# Patient Record
Sex: MALE | Race: WHITE | NOT HISPANIC OR LATINO | Employment: FULL TIME | URBAN - METROPOLITAN AREA
[De-identification: names, ages, dates, MRNs, and addresses within clinical notes are randomized per-mention and may not be internally consistent; named-entity substitution may affect disease eponyms.]

---

## 2021-05-29 ENCOUNTER — RECORDS - HEALTHEAST (OUTPATIENT)
Dept: ADMINISTRATIVE | Facility: CLINIC | Age: 36
End: 2021-05-29

## 2021-05-30 ENCOUNTER — RECORDS - HEALTHEAST (OUTPATIENT)
Dept: ADMINISTRATIVE | Facility: CLINIC | Age: 36
End: 2021-05-30

## 2021-05-31 ENCOUNTER — RECORDS - HEALTHEAST (OUTPATIENT)
Dept: ADMINISTRATIVE | Facility: CLINIC | Age: 36
End: 2021-05-31

## 2021-06-01 ENCOUNTER — RECORDS - HEALTHEAST (OUTPATIENT)
Dept: ADMINISTRATIVE | Facility: CLINIC | Age: 36
End: 2021-06-01

## 2022-04-20 ENCOUNTER — HOSPITAL ENCOUNTER (EMERGENCY)
Facility: CLINIC | Age: 37
Discharge: HOME | End: 2022-04-20
Attending: PHYSICIAN ASSISTANT | Admitting: PHYSICIAN ASSISTANT
Payer: OTHER MISCELLANEOUS

## 2022-04-20 ENCOUNTER — APPOINTMENT (OUTPATIENT)
Dept: GENERAL RADIOLOGY | Facility: CLINIC | Age: 37
End: 2022-04-20
Attending: PHYSICIAN ASSISTANT
Payer: OTHER MISCELLANEOUS

## 2022-04-20 VITALS
BODY MASS INDEX: 25.67 KG/M2 | HEART RATE: 94 BPM | OXYGEN SATURATION: 97 % | WEIGHT: 200 LBS | TEMPERATURE: 98 F | DIASTOLIC BLOOD PRESSURE: 99 MMHG | RESPIRATION RATE: 18 BRPM | SYSTOLIC BLOOD PRESSURE: 144 MMHG | HEIGHT: 74 IN

## 2022-04-20 DIAGNOSIS — S61.310A LACERATION OF RIGHT INDEX FINGER WITHOUT FOREIGN BODY WITH DAMAGE TO NAIL, INITIAL ENCOUNTER: ICD-10-CM

## 2022-04-20 PROCEDURE — 99213 OFFICE O/P EST LOW 20 MIN: CPT | Mod: 25 | Performed by: PHYSICIAN ASSISTANT

## 2022-04-20 PROCEDURE — 73140 X-RAY EXAM OF FINGER(S): CPT | Mod: RT

## 2022-04-20 PROCEDURE — 90715 TDAP VACCINE 7 YRS/> IM: CPT | Performed by: PHYSICIAN ASSISTANT

## 2022-04-20 PROCEDURE — 90471 IMMUNIZATION ADMIN: CPT | Performed by: PHYSICIAN ASSISTANT

## 2022-04-20 PROCEDURE — 12002 RPR S/N/AX/GEN/TRNK2.6-7.5CM: CPT | Performed by: PHYSICIAN ASSISTANT

## 2022-04-20 PROCEDURE — 250N000011 HC RX IP 250 OP 636: Performed by: PHYSICIAN ASSISTANT

## 2022-04-20 PROCEDURE — 250N000011 HC RX IP 250 OP 636: Performed by: EMERGENCY MEDICINE

## 2022-04-20 PROCEDURE — G0463 HOSPITAL OUTPT CLINIC VISIT: HCPCS | Mod: 25 | Performed by: PHYSICIAN ASSISTANT

## 2022-04-20 RX ORDER — ONDANSETRON 4 MG/1
4 TABLET, ORALLY DISINTEGRATING ORAL ONCE
Status: COMPLETED | OUTPATIENT
Start: 2022-04-20 | End: 2022-04-20

## 2022-04-20 RX ADMIN — CLOSTRIDIUM TETANI TOXOID ANTIGEN (FORMALDEHYDE INACTIVATED), CORYNEBACTERIUM DIPHTHERIAE TOXOID ANTIGEN (FORMALDEHYDE INACTIVATED), BORDETELLA PERTUSSIS TOXOID ANTIGEN (GLUTARALDEHYDE INACTIVATED), BORDETELLA PERTUSSIS FILAMENTOUS HEMAGGLUTININ ANTIGEN (FORMALDEHYDE INACTIVATED), BORDETELLA PERTUSSIS PERTACTIN ANTIGEN, AND BORDETELLA PERTUSSIS FIMBRIAE 2/3 ANTIGEN 0.5 ML: 5; 2; 2.5; 5; 3; 5 INJECTION, SUSPENSION INTRAMUSCULAR at 17:59

## 2022-04-20 RX ADMIN — ONDANSETRON 4 MG: 4 TABLET, ORALLY DISINTEGRATING ORAL at 16:24

## 2022-04-20 NOTE — ED PROVIDER NOTES
History     Chief Complaint   Patient presents with     Laceration     HPI  Cory Magallanes is a 36 year old male who presents to urgent care with concern of a laceration to his right index finger which occurred approximately 1 hour prior to arrival.  Patient reports that he was cutting vegetables at work when the knife slipped resulting in laceration to the radial aspect of his right index finger.  He had moderate to severe pain initially which he currently rates as 5/10.  He had significant bleeding and coworker wrapped finger in tight tape. He denies any concern for foreign body embedded in the wound.  He is unsure the date of his last tetanus vaccination.      Allergies:  No Known Allergies    Problem List:    There are no problems to display for this patient.     Past Medical History:    Past Medical History:   Diagnosis Date     Alcohol abuse      Past Surgical History:    History reviewed. No pertinent surgical history.    Family History:    Family History   Problem Relation Age of Onset     Substance Abuse Maternal Grandfather         alcoholism      Coronary Artery Disease Paternal Grandfather         MI age? 60     Social History:  Marital Status:  Single [1]  Social History     Tobacco Use     Smoking status: Current Every Day Smoker     Packs/day: 1.00     Years: 15.00     Pack years: 15.00     Types: Cigarettes     Smokeless tobacco: Never Used   Substance Use Topics     Alcohol use: Yes     Comment: once a week      Drug use: No      Medications:    HYDROcodone-acetaminophen (NORCO) 5-325 MG per tablet  ondansetron (ZOFRAN ODT) 4 MG disintegrating tablet  sucralfate (CARAFATE) 1 GM tablet      Review of Systems  INTEGUMENTARY/SKIN: POSITIVE for laceration right index finger  MUSCULOSKELETAL: POSITIVE  for right finger pain, swelling and NEGATIVE for other concerning arthralgias or myalgias  NEURO: NEGATIVE for numbness, paresthesias   Physical Exam   BP: (!) 144/99  Pulse: 94  Temp: 98  F (36.7  " C)  Resp: 18  Height: 188 cm (6' 2\")  Weight: 90.7 kg (200 lb)  SpO2: 97 %  Physical Exam  Constitutional:       General: He is not in acute distress.     Appearance: He is not ill-appearing or toxic-appearing.   HENT:      Head: Normocephalic and atraumatic.   Musculoskeletal:      Right hand: Swelling, laceration and tenderness present. No deformity or bony tenderness. Normal range of motion. Normal strength. Normal sensation. There is no disruption of two-point discrimination. Normal capillary refill.        Hands:       Comments: 3 cm subcutaneous laceration to the volar and dorsal aspect of the  distal right index finger which just crosses the proximal ulnar aspect of the nailbed    Skin:     General: Skin is warm and dry.      Findings: Laceration and wound present.   Neurological:      Mental Status: He is alert.      Comments: Sensation to light touch of right index finger is grossly intact       [facility]    -Laceration Repair  Performed by: Yessenia Segal PA-C  Authorized by: Yessenia Segal PA-C     Risks, benefits and alternatives discussed.      ANESTHESIA (see MAR for exact dosages):     Anesthesia method:  Nerve block    Block needle gauge:  30 G    Block anesthetic:  Lidocaine 1% w/o epi  LACERATION DETAILS     Location: rght index finger.    Length (cm):  3    REPAIR TYPE:     Repair type:  Simple      EXPLORATION:     Hemostasis achieved with:  Direct pressure    Wound exploration: wound explored through full range of motion and entire depth of wound probed and visualized      TREATMENT:     Area cleansed with:  Hibiclens    Amount of cleaning:  Standard    SKIN REPAIR     Repair method:  Sutures    Suture size:  4-0    Suture technique:  Simple interrupted    APPROXIMATION     Approximation:  Close    POST-PROCEDURE DETAILS     Dressing:  Antibiotic ointment and tube gauze        PROCEDURE    Patient Tolerance:  Patient tolerated the procedure " well with no immediate complications         Critical Care time:  none           Results for orders placed or performed during the hospital encounter of 04/20/22 (from the past 24 hour(s))   XR Finger Right G/E 2 Views    Narrative    EXAM: XR FINGER RT G/E 2 VW  LOCATION: [facility]  DATE/TIME: 4/20/2022 5:47 PM    INDICATION: Finger pain, laceration, concern for fracture.  COMPARISON: None.      Impression    IMPRESSION: Soft tissue laceration. No fracture or joint malalignment. On the oblique projection, there is a 1 mm radiodensity, suspected tiny superficial foreign body or focus of debris; I suspect that this is superficial, on the skin surface, as it is   more difficult to visualize on the other projections. No definite radiopaque foreign body located within the posterior soft tissues.       Medications   ondansetron (ZOFRAN-ODT) ODT tab 4 mg (4 mg Oral Given 4/20/22 1624)   Tdap (tetanus-diphtheria-acell pertussis) (ADACEL) injection 0.5 mL (0.5 mLs Intramuscular Given 4/20/22 1759)     Assessments & Plan (with Medical Decision Making)     I have reviewed the nursing notes.    I have reviewed the findings, diagnosis, plan and need for follow up with the patient.       New Prescriptions    No medications on file     Final diagnoses:   Laceration of right index finger without foreign body with damage to nail, initial encounter     36-year-old male presents to the urgent care with concern over laceration to his right index finger which occurred during work-related injury just prior to arrival.  Physical exam findings significant for 3 cm laceration to the distal right index finger which just crosses the ulnar aspect of the nailbed.  X-ray was obtained which did show a soft tissue laceration, no fracture or malalignment on a single view there was a 1 mm radiodensity suspected tiny superficial foreign body on the surface of the skin as it is difficult to visualize any other projections  which does not correspond to patients current site of laceration.   No definitive radiopaque foreign body located within the posterior soft tissue.  After discussing versus benefits he agreed to proceed with primary closure of his wound with sutures.  He tolerated procedure without any immediate complications.  He was discharged home stable with instructions for suture removal in 10 days.  Suture care instructions, signs of infection, worrisome reasons to return to ER/UC sooner discussed.     Disclaimer: This note consists of symbols derived from keyboarding, dictation, and/or voice recognition software. As a result, there may be errors in the script that have gone undetected.  Please consider this when interpreting information found in the chart.      4/20/2022   [facility] EMERGENCY DEPT     Yessenia Segal PA-C  04/24/22 [id number]

## 2023-05-12 ENCOUNTER — OFFICE VISIT (OUTPATIENT)
Dept: URGENT CARE | Facility: URGENT CARE | Age: 38
End: 2023-05-12

## 2023-05-12 VITALS
HEART RATE: 81 BPM | DIASTOLIC BLOOD PRESSURE: 87 MMHG | SYSTOLIC BLOOD PRESSURE: 134 MMHG | RESPIRATION RATE: 18 BRPM | TEMPERATURE: 97.3 F | OXYGEN SATURATION: 98 % | WEIGHT: 200 LBS | BODY MASS INDEX: 25.68 KG/M2

## 2023-05-12 DIAGNOSIS — K04.7 TOOTH INFECTION: Primary | ICD-10-CM

## 2023-05-12 PROCEDURE — 99203 OFFICE O/P NEW LOW 30 MIN: CPT | Performed by: PHYSICIAN ASSISTANT

## 2023-05-12 RX ORDER — KETOROLAC TROMETHAMINE 10 MG/1
10 TABLET, FILM COATED ORAL EVERY 6 HOURS PRN
Qty: 10 TABLET | Refills: 0 | Status: SHIPPED | OUTPATIENT
Start: 2023-05-12

## 2023-05-12 NOTE — PROGRESS NOTES
Assessment & Plan     Tooth infection  Will teat with Augmentin and toradol. Follow up with dentist.     - amoxicillin-clavulanate (AUGMENTIN) 875-125 MG tablet; Take 1 tablet by mouth 2 times daily for 10 days  - ketorolac (TORADOL) 10 MG tablet; Take 1 tablet (10 mg) by mouth every 6 hours as needed for moderate pain               Return in about 2 days (around 5/14/2023), or if symptoms worsen or fail to improve.    [facility] [place]              Subjective   Chief Complaint   Patient presents with     Urgent Care     Swelling     Per patient states he has been having swelling on left side of face and pain not sure if it's related to teeth or URI.          HPI     Facial swelling     Onset of symptoms was 2 day(s) ago.  Course of illness is worsening.    Severity moderate  Current and Associated symptoms: sore throat, tooth pain on the left   Treatment measures tried include None tried.  Predisposing factors include None.                Review of Systems   Constitutional, HEENT, cardiovascular, pulmonary, gi and gu systems are negative, except as otherwise noted.      Objective    /87   Pulse 81   Temp 97.3  F (36.3  C) (Tympanic)   Resp 18   Wt 90.7 kg (200 lb)   SpO2 98%   BMI 25.68 kg/m    Body mass index is 25.68 kg/m .         Physical Exam  Constitutional:       General: He is not in acute distress.  HENT:      Mouth/Throat:        Comments: There is moderate gum swelling and tenderness at the base of left back molar. No drainage/discharge   Neurological:      Mental Status: He is alert.

## 2023-05-12 NOTE — LETTER
May 12, 2023      Cory Magallanes  [address] [place] [address]        To Whom It May Concern:    Cory Magallanes  was seen and treated in clinic and missed work 5/12/23 and 5/13/23.    Sincerely,        Delma Nam PA-C

## 2025-02-04 ENCOUNTER — OFFICE VISIT (OUTPATIENT)
Dept: FAMILY MEDICINE | Facility: CLINIC | Age: 40
End: 2025-02-04
Payer: COMMERCIAL

## 2025-02-04 VITALS
OXYGEN SATURATION: 96 % | WEIGHT: 206 LBS | DIASTOLIC BLOOD PRESSURE: 84 MMHG | SYSTOLIC BLOOD PRESSURE: 118 MMHG | RESPIRATION RATE: 20 BRPM | HEART RATE: 91 BPM | HEIGHT: 73 IN | TEMPERATURE: 97.5 F | BODY MASS INDEX: 27.3 KG/M2

## 2025-02-04 DIAGNOSIS — H10.12 ALLERGIC RHINOCONJUNCTIVITIS OF LEFT EYE: ICD-10-CM

## 2025-02-04 DIAGNOSIS — R51.9 CHRONIC NONINTRACTABLE HEADACHE, UNSPECIFIED HEADACHE TYPE: ICD-10-CM

## 2025-02-04 DIAGNOSIS — J30.9 ALLERGIC RHINOCONJUNCTIVITIS OF LEFT EYE: ICD-10-CM

## 2025-02-04 DIAGNOSIS — J30.9 ALLERGIC RHINOCONJUNCTIVITIS OF RIGHT EYE: ICD-10-CM

## 2025-02-04 DIAGNOSIS — S06.0X0A CONCUSSION WITHOUT LOSS OF CONSCIOUSNESS, INITIAL ENCOUNTER: ICD-10-CM

## 2025-02-04 DIAGNOSIS — G62.9 NEUROPATHY: Primary | ICD-10-CM

## 2025-02-04 DIAGNOSIS — Z13.220 LIPID SCREENING: ICD-10-CM

## 2025-02-04 DIAGNOSIS — R22.9 LOCALIZED SUPERFICIAL SWELLING, MASS, OR LUMP: ICD-10-CM

## 2025-02-04 DIAGNOSIS — Z13.1 SCREENING FOR DIABETES MELLITUS: ICD-10-CM

## 2025-02-04 DIAGNOSIS — H10.11 ALLERGIC RHINOCONJUNCTIVITIS OF RIGHT EYE: ICD-10-CM

## 2025-02-04 DIAGNOSIS — G89.29 CHRONIC NONINTRACTABLE HEADACHE, UNSPECIFIED HEADACHE TYPE: ICD-10-CM

## 2025-02-04 LAB
CHOLEST SERPL-MCNC: 150 MG/DL
FASTING STATUS PATIENT QL REPORTED: YES
FASTING STATUS PATIENT QL REPORTED: YES
GLUCOSE SERPL-MCNC: 101 MG/DL (ref 70–99)
HDLC SERPL-MCNC: 25 MG/DL
LDLC SERPL CALC-MCNC: 100 MG/DL
NONHDLC SERPL-MCNC: 125 MG/DL
TRIGL SERPL-MCNC: 124 MG/DL

## 2025-02-04 PROCEDURE — 82947 ASSAY GLUCOSE BLOOD QUANT: CPT | Performed by: FAMILY MEDICINE

## 2025-02-04 PROCEDURE — 36415 COLL VENOUS BLD VENIPUNCTURE: CPT | Performed by: FAMILY MEDICINE

## 2025-02-04 PROCEDURE — 99214 OFFICE O/P EST MOD 30 MIN: CPT | Performed by: FAMILY MEDICINE

## 2025-02-04 PROCEDURE — 80061 LIPID PANEL: CPT | Performed by: FAMILY MEDICINE

## 2025-02-04 ASSESSMENT — ENCOUNTER SYMPTOMS: EYE PAIN: 1

## 2025-02-04 ASSESSMENT — PAIN SCALES - GENERAL: PAINLEVEL_OUTOF10: MODERATE PAIN (5)

## 2025-02-04 NOTE — PROGRESS NOTES
"  Assessment & Plan   Problem List Items Addressed This Visit    None  Visit Diagnoses       Neuropathy    -  Primary    Relevant Medications    amitriptyline (ELAVIL) 25 MG tablet    Chronic nonintractable headache, unspecified headache type        Relevant Medications    amitriptyline (ELAVIL) 25 MG tablet    Lipid screening        Relevant Orders    Lipid panel reflex to direct LDL Non-fasting    Screening for diabetes mellitus        Relevant Orders    Glucose    Allergic rhinoconjunctivitis of left eye        Allergic rhinoconjunctivitis of right eye        Concussion without loss of consciousness, initial encounter                 Patient is a 39 yr old male here for multiple issues.     Allergy eye- recommend seeing an eye specialist- suspect he may need steroid eye drops    Headaches- likely due to concussion. Recommend amitriptyline    Neuropathy- etiology unclear. Could be chronic back pain.     Lump- appears like a cyst. Recommend watch and wait.          Nicotine/Tobacco Cessation  He reports that he has been smoking cigarettes. He has a 15 pack-year smoking history. He has never used smokeless tobacco.  Nicotine/Tobacco Cessation Plan  Information offered: Patient not interested at this time      BMI  Estimated body mass index is 26.99 kg/m  as calculated from the following:    Height as of this encounter: 1.861 m (6' 1.25\").    Weight as of this encounter: 93.4 kg (206 lb).       FUTURE APPOINTMENTS:       - Follow-up visit as needed.      Yudi Ray is a 39 year old, presenting for the following health issues:    Patient is a 39 yr old male here for several issues.     Allergy eye symptoms  He reports that he has had watery dry eye for a couple of months. He has red eyes but no purulent discharge from his eyes. His conjunctivae is red.   Patient reports that he has used over the counter medication with no relief. He gets no relief even from allergy medication.    Concussion/Chronic " "Headaches  Patient also reports that he fell a few months ago and fell hard on his posterior part of his head. He had some dizziness and lightheaded feeling after that.  Patient reports that since then he has been experiencing daily severe headaches. Headaches have not inhibited him from work or doing things. He reports no vision changes with the headaches. He was not seen or evaluated following that fall. This will be his second concussion as he had one when he was in      Numbness and tingling in both lower extremities  Lastly he has been experiencing numbness and tingling in both feet. He says he stands on his feet all day. Patient reports that he has tenderness as well in both feet. He says they are quite \"sensitive\" he did mention chronic back issues for years. He has never been screened for diabetes that he is aware of.      Lump on ear lobe  The lump has been there for years. It is non painful. No drainage or bleeding from the lump. It is the same size as it as been.    Eye Problem (Dry and watery eyes.  Started this winter.  He is not sure if this is allergy related. Has tried multiple OTC allergy medications.  All the treatments seem to make his symptoms worse.), Concussion (Concussion about 2-3 months ago.  Has been having a steady headache since.  There is a pounding sensation for the back of the head.  States that is a 5/10 today.), Foot Problems (Both feet have a tingling feeling x 8 months.  Both legs can feel numb off and on.  He is not sure why. ), Ear Problem (Has a lump right face near the upper part of the ear.  There is no pain.  It just keeps getting larger.), and Imm/Inj (Declined Covid and Flu injections today.  Discuss if he is due for a Prevnar 20.)        2/4/2025     8:21 AM   Additional Questions   Roomed by Anne Mcneil CMA   Accompanied by Eugenie-girl friend.     Chief Complaint   Patient presents with    Eye Problem     Dry and watery eyes.  Started this winter.  He is not sure if " "this is allergy related. Has tried multiple OTC allergy medications.  All the treatments seem to make his symptoms worse.    Concussion     Concussion about 2-3 months ago.  Has been having a steady headache since.  There is a pounding sensation for the back of the head.  States that is a 5/10 today.    Foot Problems     Both feet have a tingling feeling x 8 months.  Both legs can feel numb off and on.  He is not sure why.     Ear Problem     Has a lump right face near the upper part of the ear.  There is no pain.  It just keeps getting larger.    Imm/Inj     Declined Covid and Flu injections today.  Discuss if he is due for a Prevnar 20.       Eye Problem     Ear Problem    History of Present Illness       Headaches:   Since the patient's last clinic visit, headaches are: no change  The patient is getting headaches:  Constant  He is able to do normal daily activities when he has a migraine.  The patient is taking the following rescue/relief medications:  No rescue/relief medications   Patient states \"The relief is inconsistent\" from the rescue/relief medications.   The patient is taking the following medications to prevent migraines:  No medications to prevent migraines  In the past 4 weeks, the patient has gone to an Urgent Care or Emergency Room 0 times times due to headaches.    Reason for visit:  Red watery eyes  Symptom onset:  More than a month  Symptoms include:  Watery eyes  Symptom intensity:  Severe  Symptom progression:  Worsening  Had these symptoms before:  Yes  Has tried/received treatment for these symptoms:  No  What makes it worse:  Breanna outside  What makes it better:  No   He is taking medications regularly.             Review of Systems  CONSTITUTIONAL: NEGATIVE for fever, chills, change in weight  INTEGUMENTARY/SKIN: NEGATIVE for worrisome rashes, moles or lesions  EYES: POSITIVE for itching both, redness both, and tearing both  ENT/MOUTH: NEGATIVE for ear, mouth and throat problems  RESP: " "NEGATIVE for significant cough or SOB  CV: NEGATIVE for chest pain, palpitations or peripheral edema  MUSCULOSKELETAL: POSITIVE  for back pain , paresthesias, and radicular pain   NEURO: POSITIVE for paresthesias  and numbness or tingling   ENDOCRINE: NEGATIVE for temperature intolerance, skin/hair changes  HEME/ALLERGY/IMMUNE: NEGATIVE for bleeding problems  PSYCHIATRIC: NEGATIVE for changes in mood or affect      Objective    /84 (BP Location: Left arm, Patient Position: Chair, Cuff Size: Adult Large)   Pulse 91   Temp 97.5  F (36.4  C) (Tympanic)   Resp 20   Ht 1.861 m (6' 1.25\")   Wt 93.4 kg (206 lb)   SpO2 96%   BMI 26.99 kg/m    Body mass index is 26.99 kg/m .  Physical Exam   GENERAL: alert and no distress  EYES: Eyes grossly normal to inspection, PERRL and conjunctivae and sclerae normal  HENT: ear canals and TM's normal, nose and mouth without ulcers or lesions  NECK: no adenopathy, no asymmetry, masses, or scars  RESP: lungs clear to auscultation - no rales, rhonchi or wheezes  CV: regular rate and rhythm, normal S1 S2, no S3 or S4, no murmur, click or rub, no peripheral edema  ABDOMEN: soft, nontender, no hepatosplenomegaly, no masses and bowel sounds normal  MS: no gross musculoskeletal defects noted, no edema  SKIN: no suspicious lesions or rashes, lump on right ear   NEURO: Normal strength and tone, mentation intact and speech normal  Diabetic foot exam: no trophic changes or ulcerative lesions and abnormal sensory exam    Labs pending        Signed Electronically by: Juliane Talamantes MD    "

## 2025-02-04 NOTE — RESULT ENCOUNTER NOTE
Please inform patient that test result showed elevated cholesterol. Recommend lifestyle changes. Glucose was slightly high. Recommend diet and exercise.   Thank you.     Juliane Talamantes M.D.

## 2025-02-04 NOTE — LETTER
February 4, 2025      Cory Hernándezalisons  [address] [place] [address]        Dear ,    We are writing to inform you of your test results.    Please inform patient that test result showed elevated cholesterol. Recommend lifestyle changes. Glucose was slightly high. Recommend diet and exercise.  Thank you.     Juliane Talamantes M.D.    Resulted Orders   Glucose   Result Value Ref Range    Glucose 101 (H) 70 - 99 mg/dL    Patient Fasting > 8hrs? Yes    Lipid panel reflex to direct LDL Non-fasting   Result Value Ref Range    Cholesterol 150 <200 mg/dL    Triglycerides 124 <150 mg/dL    Direct Measure HDL 25 (L) >=40 mg/dL    LDL Cholesterol Calculated 100 (H) <100 mg/dL    Non HDL Cholesterol 125 <130 mg/dL    Patient Fasting > 8hrs? Yes     Narrative    Cholesterol  Desirable: < 200 mg/dL  Borderline High: 200 - 239 mg/dL  High: >= 240 mg/dL    Triglycerides  Normal: < 150 mg/dL  Borderline High: 150 - 199 mg/dL  High: 200-499 mg/dL  Very High: >= 500 mg/dL    Direct Measure HDL  Female: >= 50 mg/dL   Male: >= 40 mg/dL    LDL Cholesterol  Desirable: < 100 mg/dL  Above Desirable: 100 - 129 mg/dL   Borderline High: 130 - 159 mg/dL   High:  160 - 189 mg/dL   Very High: >= 190 mg/dL    Non HDL Cholesterol  Desirable: < 130 mg/dL  Above Desirable: 130 - 159 mg/dL  Borderline High: 160 - 189 mg/dL  High: 190 - 219 mg/dL  Very High: >= 220 mg/dL       If you have any questions or concerns, please call the clinic at the number listed above.       Sincerely,      Juliane Talmaantes MD    Electronically signed

## 2025-03-10 ENCOUNTER — NURSE TRIAGE (OUTPATIENT)
Dept: FAMILY MEDICINE | Facility: CLINIC | Age: 40
End: 2025-03-10

## 2025-03-10 ENCOUNTER — OFFICE VISIT (OUTPATIENT)
Dept: FAMILY MEDICINE | Facility: CLINIC | Age: 40
End: 2025-03-10
Payer: COMMERCIAL

## 2025-03-10 ENCOUNTER — HOSPITAL ENCOUNTER (OUTPATIENT)
Dept: ULTRASOUND IMAGING | Facility: CLINIC | Age: 40
Discharge: HOME | End: 2025-03-10
Attending: NURSE PRACTITIONER | Admitting: NURSE PRACTITIONER
Payer: COMMERCIAL

## 2025-03-10 VITALS
OXYGEN SATURATION: 93 % | HEIGHT: 73 IN | DIASTOLIC BLOOD PRESSURE: 74 MMHG | SYSTOLIC BLOOD PRESSURE: 108 MMHG | BODY MASS INDEX: 28.1 KG/M2 | RESPIRATION RATE: 16 BRPM | HEART RATE: 94 BPM | WEIGHT: 212 LBS | TEMPERATURE: 97.4 F

## 2025-03-10 DIAGNOSIS — M54.41 CHRONIC BILATERAL LOW BACK PAIN WITH BILATERAL SCIATICA: ICD-10-CM

## 2025-03-10 DIAGNOSIS — G62.9 NEUROPATHY: ICD-10-CM

## 2025-03-10 DIAGNOSIS — R73.09 ELEVATED GLUCOSE: ICD-10-CM

## 2025-03-10 DIAGNOSIS — F10.10 ALCOHOL ABUSE: ICD-10-CM

## 2025-03-10 DIAGNOSIS — R60.0 LEG EDEMA, RIGHT: Primary | ICD-10-CM

## 2025-03-10 DIAGNOSIS — D69.6 THROMBOCYTOPENIA: ICD-10-CM

## 2025-03-10 DIAGNOSIS — G89.29 CHRONIC BILATERAL LOW BACK PAIN WITH BILATERAL SCIATICA: ICD-10-CM

## 2025-03-10 DIAGNOSIS — R60.0 LEG EDEMA, RIGHT: ICD-10-CM

## 2025-03-10 DIAGNOSIS — M54.42 CHRONIC BILATERAL LOW BACK PAIN WITH BILATERAL SCIATICA: ICD-10-CM

## 2025-03-10 DIAGNOSIS — R74.8 ELEVATED LIVER ENZYMES: ICD-10-CM

## 2025-03-10 LAB
ALBUMIN SERPL BCG-MCNC: 3.4 G/DL (ref 3.5–5.2)
ALP SERPL-CCNC: 277 U/L (ref 40–150)
ALT SERPL W P-5'-P-CCNC: 60 U/L (ref 0–70)
ANION GAP SERPL CALCULATED.3IONS-SCNC: 13 MMOL/L (ref 7–15)
AST SERPL W P-5'-P-CCNC: 243 U/L (ref 0–45)
BILIRUB SERPL-MCNC: 3.4 MG/DL
BUN SERPL-MCNC: 8.1 MG/DL (ref 6–20)
CALCIUM SERPL-MCNC: 8.7 MG/DL (ref 8.8–10.4)
CHLORIDE SERPL-SCNC: 105 MMOL/L (ref 98–107)
CREAT SERPL-MCNC: 0.76 MG/DL (ref 0.67–1.17)
CRP SERPL-MCNC: 5.97 MG/L
EGFRCR SERPLBLD CKD-EPI 2021: >90 ML/MIN/1.73M2
ERYTHROCYTE [DISTWIDTH] IN BLOOD BY AUTOMATED COUNT: 15.3 % (ref 10–15)
ERYTHROCYTE [SEDIMENTATION RATE] IN BLOOD BY WESTERGREN METHOD: 14 MM/HR (ref 0–15)
EST. AVERAGE GLUCOSE BLD GHB EST-MCNC: 100 MG/DL
FERRITIN SERPL-MCNC: 509 NG/ML (ref 31–409)
GLUCOSE SERPL-MCNC: 107 MG/DL (ref 70–99)
HBA1C MFR BLD: 5.1 % (ref 0–5.6)
HCO3 SERPL-SCNC: 25 MMOL/L (ref 22–29)
HCT VFR BLD AUTO: 44.1 % (ref 40–53)
HGB BLD-MCNC: 15.5 G/DL (ref 13.3–17.7)
IRON BINDING CAPACITY (ROCHE): 196 UG/DL (ref 240–430)
IRON SATN MFR SERPL: 39 % (ref 15–46)
IRON SERPL-MCNC: 77 UG/DL (ref 61–157)
MCH RBC QN AUTO: 35.6 PG (ref 26.5–33)
MCHC RBC AUTO-ENTMCNC: 35.1 G/DL (ref 31.5–36.5)
MCV RBC AUTO: 101 FL (ref 78–100)
PLAT MORPH BLD: NORMAL
PLATELET # BLD AUTO: 36 10E3/UL (ref 150–450)
POTASSIUM SERPL-SCNC: 3.7 MMOL/L (ref 3.4–5.3)
PROT SERPL-MCNC: 7.2 G/DL (ref 6.4–8.3)
RBC # BLD AUTO: 4.35 10E6/UL (ref 4.4–5.9)
RBC MORPH BLD: NORMAL
SODIUM SERPL-SCNC: 143 MMOL/L (ref 135–145)
WBC # BLD AUTO: 6.6 10E3/UL (ref 4–11)

## 2025-03-10 PROCEDURE — 3074F SYST BP LT 130 MM HG: CPT | Performed by: NURSE PRACTITIONER

## 2025-03-10 PROCEDURE — 82728 ASSAY OF FERRITIN: CPT | Performed by: NURSE PRACTITIONER

## 2025-03-10 PROCEDURE — 80053 COMPREHEN METABOLIC PANEL: CPT | Performed by: NURSE PRACTITIONER

## 2025-03-10 PROCEDURE — 83036 HEMOGLOBIN GLYCOSYLATED A1C: CPT | Performed by: NURSE PRACTITIONER

## 2025-03-10 PROCEDURE — 99214 OFFICE O/P EST MOD 30 MIN: CPT | Performed by: NURSE PRACTITIONER

## 2025-03-10 PROCEDURE — 87340 HEPATITIS B SURFACE AG IA: CPT | Performed by: NURSE PRACTITIONER

## 2025-03-10 PROCEDURE — 85027 COMPLETE CBC AUTOMATED: CPT | Performed by: NURSE PRACTITIONER

## 2025-03-10 PROCEDURE — 93970 EXTREMITY STUDY: CPT

## 2025-03-10 PROCEDURE — 83540 ASSAY OF IRON: CPT | Performed by: NURSE PRACTITIONER

## 2025-03-10 PROCEDURE — 3078F DIAST BP <80 MM HG: CPT | Performed by: NURSE PRACTITIONER

## 2025-03-10 PROCEDURE — G2211 COMPLEX E/M VISIT ADD ON: HCPCS | Performed by: NURSE PRACTITIONER

## 2025-03-10 PROCEDURE — 82248 BILIRUBIN DIRECT: CPT | Performed by: NURSE PRACTITIONER

## 2025-03-10 PROCEDURE — 83550 IRON BINDING TEST: CPT | Performed by: NURSE PRACTITIONER

## 2025-03-10 PROCEDURE — 85652 RBC SED RATE AUTOMATED: CPT | Performed by: NURSE PRACTITIONER

## 2025-03-10 PROCEDURE — 36415 COLL VENOUS BLD VENIPUNCTURE: CPT | Performed by: NURSE PRACTITIONER

## 2025-03-10 PROCEDURE — 86140 C-REACTIVE PROTEIN: CPT | Performed by: NURSE PRACTITIONER

## 2025-03-10 PROCEDURE — 1125F AMNT PAIN NOTED PAIN PRSNT: CPT | Performed by: NURSE PRACTITIONER

## 2025-03-10 RX ORDER — GABAPENTIN 100 MG/1
100 CAPSULE ORAL 3 TIMES DAILY
Qty: 90 CAPSULE | Refills: 0 | Status: SHIPPED | OUTPATIENT
Start: 2025-03-10

## 2025-03-10 ASSESSMENT — PAIN SCALES - GENERAL: PAINLEVEL_OUTOF10: SEVERE PAIN (8)

## 2025-03-10 ASSESSMENT — ENCOUNTER SYMPTOMS: NUMBNESS: 1

## 2025-03-10 NOTE — ASSESSMENT & PLAN NOTE
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  Chronic back pain and lumbar back that radiates into his thorax.  Intermittent radiculopathy and paresthesias.  Currently has neuropathy in bilateral feet all the way up to the posterior knees.  Referral to spine care for further evaluation.

## 2025-03-10 NOTE — ASSESSMENT & PLAN NOTE
Unclear cause, glucose is elevated but not at diabetic levels  He does have chronic back pain with some radiculopathy but it seems like he has more neuropathy in his feet and that radiates up.    EMG ordered  I will very strongly consider neurology consult for further evaluation   start with gabapentin 100 mg 3 times a day can go up from there if this is no improvement.

## 2025-03-10 NOTE — ASSESSMENT & PLAN NOTE
Drinks more than 6 beers per day  Check liver function testing today.  Triglycerides were under 150  Initial recommendation will be to have him cut down however I suspect that this is going to be difficult and would consider a referral to chemical dependency in the future.

## 2025-03-10 NOTE — TELEPHONE ENCOUNTER
"See triage below. Patient denies fever. Patient was scheduled at the [facility] today with  [facility].    Aniya Skinner RN          Reason for Disposition   SEVERE pain (e.g., excruciating, unable to do any normal activities)    Additional Information   Negative: Followed a foot injury   Negative: Toe pain is main symptom   Negative: Ankle pain is main symptom   Negative: Entire foot is cool or blue in comparison to other foot   Negative: Purple or black skin on foot or toe   Negative: Red area or streak and fever     Denies fever   Negative: Swollen foot and fever   Negative: Patient sounds very sick or weak to the triager     Patient is able to work and walk    Answer Assessment - Initial Assessment Questions  1. ONSET: \"When did the pain start?\"       1 week age but getting worse.  2. LOCATION: \"Where is the pain located?\"       Right foot, ankle and calf.  3. PAIN: \"How bad is the pain?\"    (Scale 1-10; or mild, moderate, severe)      8-9/10  4. WORK OR EXERCISE: \"Has there been any recent work or exercise that involved this part of the body?\"       No known injury. He has been able to work  5. CAUSE: \"What do you think is causing the foot pain?\"      Swollen and red  6. OTHER SYMPTOMS: \"Do you have any other symptoms?\" (e.g., leg pain, rash, fever, numbness)      Pain is in foot, ankle and calf, no fever. Both feet has numbness and tingling.  7. PREGNANCY: \"Is there any chance you are pregnant?\" \"When was your last menstrual period?\"      na    Protocols used: Foot Pain-A-[place]    "

## 2025-03-10 NOTE — PROGRESS NOTES
Assessment & Plan   Problem List Items Addressed This Visit       Alcohol abuse     Drinks more than 6 beers per day  Check liver function testing today.  Triglycerides were under 150  Initial recommendation will be to have him cut down however I suspect that this is going to be difficult and would consider a referral to chemical dependency in the future.         Neuropathy     Unclear cause, glucose is elevated but not at diabetic levels  He does have chronic back pain with some radiculopathy but it seems like he has more neuropathy in his feet and that radiates up.    EMG ordered  I will very strongly consider neurology consult for further evaluation   start with gabapentin 100 mg 3 times a day can go up from there if this is no improvement.           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  Chronic back pain and lumbar back that radiates into his thorax.  Intermittent radiculopathy and paresthesias.  Currently has neuropathy in bilateral feet all the way up to the posterior knees.  Referral to spine care for further evaluation.         Relevant Medications    gabapentin (NEURONTIN) 100 MG capsule    Other Relevant Orders    Spine  Referral     Other Visit Diagnoses       Leg edema, right    -  Primary    Relevant Orders    CBC with platelets    Comprehensive metabolic panel (BMP + Alb, Alk Phos, ALT, AST, Total. Bili, TP)    ESR: Erythrocyte sedimentation rate (Completed)    CRP, inflammation    US Lower Extremity Venous Duplex Bilateral    Elevated glucose        Relevant Orders    Hemoglobin A1c (Completed)          Right leg edema: DD includes DVT, venous stasis, superficial phlebitis.  Will start lab testing, ultrasound to be done today to evaluate for DVT.  He has no family history or personal history of DVTs.  But he does have a  "significant increase in the size of his right calf over the left calf.  From there we can decide what the next steps are.  Referral placed for vasculare medicine. U/S bilateral lower extremities for negative for DVT.  It is possible that his swelling in his lower extremities is due to liver failure secondary to alcohol.      Addendum 5 pm 3/10/2025  Thrombocytopenia  Platelets are 36 unclear etiology but further testing is placed; HIV, Hep C, PTT  Referral to Hematology and an E-consult is placed  Recommend they come back for the lab testing tomorrow    Elevated liver enzymes  Suspect Alcohol hepatic steatosis/fibrosis  Fib 4 score 35.73 (Advanced liver fibrosis)  Discussed with patient and significant other to discontinue alcohol immediately  Labs ordered  Fibrosis scan ordered  Referral to liver speciality and chemical dependency.  For now will hold on naltrexone because his liver enzymes are elevated.     BMI  Estimated body mass index is 27.78 kg/m  as calculated from the following:    Height as of this encounter: 1.861 m (6' 1.25\").    Weight as of this encounter: 96.2 kg (212 lb).   Weight management plan: will discuss in future visits    FUTURE APPOINTMENTS:       - Follow-up visit in 1-2 weeks      Subjective   Cory is a 39 year old, presenting for the following health issues:  Musculoskeletal Problem    History of Present Illness       Reason for visit:  Right foot and leg red and swollen and hurting.no known injury to cause this.elevated foot last night while sleeping and swelling went down slightly.  Symptom onset:  More than a month  Symptom intensity:  Moderate  Symptom progression:  Worsening  Had these symptoms before:  Yes  Has tried/received treatment for these symptoms:  Yes  Previous treatment was successful:  No  What makes it worse:  No  What makes it better:  No   He is taking medications regularly.        Patient is here with several concerns.      Right lower extremity edema: Over the past " "week he has had increasing swelling in his right lower extremity, calf pain, edema in the ankle and into the calf.  Family or personal history of blood clots.  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  The rest of his lifestyle he is sedentary watching TV and drinking beer.  No recent surgeries or trips.    numbness in both feet: Has occurred for the past 1-1/2 to 2 years.  Feels that it has worsened recently.  He is not having a lot of sensation at all in his toes.  He also has significant numbness and tingling and neuropathic pain.  Sensation change all the way up to his posterior knee.  No known history of diabetes.  Glucose was checked he was seen by Dr. Talamantes 2/4/2025, she treated him with amitriptyline which he did not find to be helpful.    Chronic back pain: Ongoing for many years he used to work in construction as a , pain is periodic and intermittent, sometimes he does have symptoms that radiate down his legs.  States it starts in the lumbar back and then radiates up into affect his thoracic back.  He has never had any imaging or care from spine orthopedics.    Alcohol use: States that he drinks 6+ beers per day every day.      Tobacco 12 years 1/ pack per day; the past several months has decreased his cigarette use to 5/day  Nutrition: Tends to eat a [employer] diet, likes protein and vegetables.  Does not tend to eat a lot of processed foods or fried foods.  Exercise: Physically active at his job but otherwise is sedentary  Drugs: No other drugs    Denies chest pain, shortness of breath,   Occasional wheezing, coughing in the morning    Review of Systems  Constitutional, HEENT, cardiovascular, pulmonary, gi and gu systems are negative, except as otherwise noted.      Objective    /74 (BP Location: Right arm, Patient Position: Sitting, Cuff Size: Adult Large)   Pulse 94   Temp 97.4  F (36.3  C) (Tympanic)   Resp 16   Ht 1.861 m (6' 1.25\")   Wt 96.2 kg (212 lb)   SpO2 93%   BMI " 27.78 kg/m    Body mass index is 27.78 kg/m .  Physical Exam  Constitutional:       Appearance: Normal appearance.   HENT:      Head: Normocephalic.      Right Ear: Tympanic membrane, ear canal and external ear normal.      Left Ear: Tympanic membrane, ear canal and external ear normal.      Nose: Nose normal.      Mouth/Throat:      Mouth: Mucous membranes are moist.      Pharynx: Oropharynx is clear.   Eyes:      Extraocular Movements: Extraocular movements intact.      Conjunctiva/sclera: Conjunctivae normal.      Pupils: Pupils are equal, round, and reactive to light.   Neck:      Thyroid: No thyroid mass, thyromegaly or thyroid tenderness.      Trachea: Trachea normal.   Cardiovascular:      Rate and Rhythm: Normal rate and regular rhythm.      Pulses:           Dorsalis pedis pulses are 2+ on the right side and 2+ on the left side.        Posterior tibial pulses are 2+ on the right side and 2+ on the left side.      Heart sounds: Normal heart sounds.   Pulmonary:      Effort: Pulmonary effort is normal.      Breath sounds: Normal breath sounds.   Abdominal:      General: Abdomen is flat. Bowel sounds are normal.   Musculoskeletal:         General: Normal range of motion.      Cervical back: Normal range of motion.      Right lower le+ Edema present.      Left lower leg: Normal.      Comments: Right calf size 41 cm  Left calf size 36.5 cm   Feet:      Right foot:      Skin integrity: Skin integrity normal.      Toenail Condition: Right toenails are normal.      Left foot:      Skin integrity: Skin integrity normal.      Toenail Condition: Left toenails are normal.      Comments: Neuropathy and difficulty sensing the microfilament in his bilateral feet ankles shins.  He could start to feel the microfilament at inferior knee bilaterally.  Lymphadenopathy:      Cervical: No cervical adenopathy.   Skin:     General: Skin is warm and dry.   Neurological:      Mental Status: He is alert and oriented to person,  place, and time.   Psychiatric:         Mood and Affect: Mood normal.         Behavior: Behavior normal.         Thought Content: Thought content normal.         Judgment: Judgment normal.                    Signed Electronically by: RICHARD Amaya CNP

## 2025-03-11 ENCOUNTER — E-CONSULT (OUTPATIENT)
Dept: TRANSPLANT | Facility: CLINIC | Age: 40
End: 2025-03-11
Payer: COMMERCIAL

## 2025-03-11 ENCOUNTER — TELEPHONE (OUTPATIENT)
Dept: VASCULAR SURGERY | Facility: CLINIC | Age: 40
End: 2025-03-11

## 2025-03-11 ENCOUNTER — LAB (OUTPATIENT)
Dept: LAB | Facility: CLINIC | Age: 40
End: 2025-03-11
Payer: COMMERCIAL

## 2025-03-11 DIAGNOSIS — D69.6 THROMBOCYTOPENIA: ICD-10-CM

## 2025-03-11 DIAGNOSIS — R74.8 ELEVATED LIVER ENZYMES: ICD-10-CM

## 2025-03-11 DIAGNOSIS — R60.0 LEG EDEMA, RIGHT: Primary | ICD-10-CM

## 2025-03-11 LAB
APTT PPP: 39 SECONDS (ref 22–38)
BASOPHILS # BLD AUTO: 0.1 10E3/UL (ref 0–0.2)
BASOPHILS NFR BLD AUTO: 2 %
BILIRUB DIRECT SERPL-MCNC: 2.23 MG/DL (ref 0–0.3)
BILIRUB SERPL-MCNC: 3.4 MG/DL
D DIMER PPP FEU-MCNC: 2.7 UG/ML FEU (ref 0–0.5)
EOSINOPHIL # BLD AUTO: 0.2 10E3/UL (ref 0–0.7)
EOSINOPHIL NFR BLD AUTO: 3 %
ERYTHROCYTE [DISTWIDTH] IN BLOOD BY AUTOMATED COUNT: 15.3 % (ref 10–15)
FIBRINOGEN PPP-MCNC: 229 MG/DL (ref 170–510)
HBV SURFACE AG SERPL QL IA: NONREACTIVE
HCT VFR BLD AUTO: 45.1 % (ref 40–53)
HGB BLD-MCNC: 15.7 G/DL (ref 13.3–17.7)
IMM GRANULOCYTES # BLD: 0 10E3/UL
IMM GRANULOCYTES NFR BLD: 0 %
INR PPP: 1.37 (ref 0.85–1.15)
LYMPHOCYTES # BLD AUTO: 2.5 10E3/UL (ref 0.8–5.3)
LYMPHOCYTES NFR BLD AUTO: 38 %
MCH RBC QN AUTO: 35.8 PG (ref 26.5–33)
MCHC RBC AUTO-ENTMCNC: 34.8 G/DL (ref 31.5–36.5)
MCV RBC AUTO: 103 FL (ref 78–100)
MONOCYTES # BLD AUTO: 0.6 10E3/UL (ref 0–1.3)
MONOCYTES NFR BLD AUTO: 10 %
NEUTROPHILS # BLD AUTO: 3.1 10E3/UL (ref 1.6–8.3)
NEUTROPHILS NFR BLD AUTO: 48 %
NRBC # BLD AUTO: 0 10E3/UL
NRBC BLD AUTO-RTO: 0 /100
PLAT MORPH BLD: NORMAL
PLATELET # BLD AUTO: 33 10E3/UL (ref 150–450)
RBC # BLD AUTO: 4.39 10E6/UL (ref 4.4–5.9)
RBC MORPH BLD: NORMAL
RETICS # AUTO: 0.11 10E6/UL (ref 0.03–0.1)
RETICS/RBC NFR AUTO: 2.4 % (ref 0.5–2)
WBC # BLD AUTO: 6.6 10E3/UL (ref 4–11)

## 2025-03-11 PROCEDURE — 85379 FIBRIN DEGRADATION QUANT: CPT

## 2025-03-11 PROCEDURE — 85730 THROMBOPLASTIN TIME PARTIAL: CPT

## 2025-03-11 PROCEDURE — 86803 HEPATITIS C AB TEST: CPT

## 2025-03-11 PROCEDURE — 85610 PROTHROMBIN TIME: CPT

## 2025-03-11 PROCEDURE — 85025 COMPLETE CBC W/AUTO DIFF WBC: CPT

## 2025-03-11 PROCEDURE — 99451 NTRPROF PH1/NTRNET/EHR 5/>: CPT | Performed by: INTERNAL MEDICINE

## 2025-03-11 PROCEDURE — 85384 FIBRINOGEN ACTIVITY: CPT

## 2025-03-11 PROCEDURE — 85045 AUTOMATED RETICULOCYTE COUNT: CPT

## 2025-03-11 PROCEDURE — 99207 BLOOD MORPHOLOGY PATHOLOGIST REVIEW: CPT | Performed by: PATHOLOGY

## 2025-03-11 PROCEDURE — 36415 COLL VENOUS BLD VENIPUNCTURE: CPT

## 2025-03-11 RX ORDER — NALTREXONE HYDROCHLORIDE 50 MG/1
50 TABLET, FILM COATED ORAL DAILY
Qty: 90 TABLET | Refills: 0 | Status: SHIPPED | OUTPATIENT
Start: 2025-03-11 | End: 2025-03-11

## 2025-03-11 NOTE — TELEPHONE ENCOUNTER
Vascular Referral Intake    Appointment note (to be pasted into appt note. Also add where additional info is located ie: outside images pushed to PACS, in Epic, sent to HIM, etc): RLE swelling, DVT study negative, possible swelling d/t liver failure secondary to alcohol    Referred by Chastity Baltazar APRN CNP for Leg edema, right     Specialty: Vascular Medicine    Specific Provider if Necessary:  Dr. Judith Mcarthur    Visit Type: New    Time Frame: Next Available    Testing/Imaging Needed Before Consult: N/A    Iris or bed needed: No    *Schedulers: Please send welcome letter to patient after appointment(s) scheduled*

## 2025-03-11 NOTE — PROGRESS NOTES
3/11/2025     E-Consult has been accepted.    Interprofessional consultation requested by:  Chastity Batlazar APRN CNP      Clinical Question/Purpose: MY CLINICAL QUESTION IS: patient's platelets are 36, has right leg edema, venous ultrasound was negative for DVT. Patient is alcoholic; AST 250s, patient has been undoctored for the past 5-7 years. It is unclear if this is acute or chronic. Hep c, HIV, PTT testing is pending for tomorrow.  Over the past week he has had increasing leg edema in the right leg.What are my next steps for testing? And does he need to be seen sooner by hematology? Could alcohol be playing a role?    Patient assessment and information reviewed:   - EtOH 6 beers per day    Cr 0.76  , ALT 60 (in 12/3/2018 it was 88/158, Tbili 0.8, Dbili 0.3)  Tbili 3.4, Dbili not done  AlkP 277  Alb 3.4  Ferritin 509  WBC 6.6, diff not done.  Hgb 15.5,   Plts 36    Last plts 12/2018 180, 2016 was 180, 2013 plts 243, 2009 was 238    BLE US neg for DVT    Recommendations:   Increasing leg edema - unclear cause. Consider getting CTAP with venogram to look at abdominal/pelvic vasculature and May Thurner syndrome higher up.   Thrombocytoepnia - could be from liver failure (given low albumin and high bili). Three potential causes related to alcohol and liver disease - direct alcohol toxicity on the marrow, poor liver synthetic function causing low thrombopoietin, or portal hypertension causing hypersplenism. The CT AP could be helpful for looking for splenomegaly if he has a difficult exam. Could also be consumption from a clot. CT AP with venogram could also look for portal vein thrombosis.   Check D-Dimer, PTT, INR, fibrinogen, get T bili and D bili.  Encourage EtOH cessation, follow CBC with diff.   Happy to help interpret any follow up labs/imaging.  Consider hepatology referral for guideline based care for patients with cirrhosis?    The recommendations provided in this E-Consult are based on  a review of clinical data pertinent to the clinical question presented, without a review of the patient's complete medical record or, the benefit of a comprehensive in-person or virtual patient evaluation. This consultation should not replace the clinical judgement and evaluation of the provider ordering this E-Consult. Any new clinical issues, or changes in patient status since the filing of this E-Consult will need to be taken into account when assessing these recommendations. Please contact me if you have further questions.    My total time spent reviewing clinical information and formulating assessment was 15 minutes.    Rashad Herman MD

## 2025-03-11 NOTE — PROGRESS NOTES
"Clinic staff: Please call patient to schedule lab only visit today for additional labs that I have ordered.     Nursing staff: please call patient to let him know to schedule the CT scan of abdomen pelvis. Please let him know that I received an answer from the Hematologist doctor who advised a CT/abd pelvis with venogram to check the organs and vasculature of the abdomen/pelvis. I also have ordered additional labs for them to have done today with a lab only appointment.         Received advise from hematology Rashad Herman MD     \"Recommendations:   Increasing leg edema - unclear cause. Consider getting CTAP with venogram to look at abdominal/pelvic vasculature and May Thurner syndrome higher up.   Thrombocytoepnia - could be from liver failure (given low albumin and high bili). Three potential causes related to alcohol and liver disease - direct alcohol toxicity on the marrow, poor liver synthetic function causing low thrombopoietin, or portal hypertension causing hypersplenism. The CT AP could be helpful for looking for splenomegaly if he has a difficult exam. Could also be consumption from a clot. CT AP with venogram could also look for portal vein thrombosis.   Check D-Dimer, PTT, INR, fibrinogen, get T bili and D bili.  Encourage EtOH cessation, follow CBC with diff.   Happy to help interpret any follow up labs/imaging.  Consider hepatology referral for guideline based care for patients with cirrhosis?\"    Referral given for hepatology, chem dep, Hematology.     CTAP with venogram ordered now  Labs ordered D-dimer, PTT, INR, fibrinogen, Total and direct bili, I also ordered labs to check liver cirrohoiss secondary causes and fibrosis scan.     Chastity Cesar, APRN, CNP    "

## 2025-03-12 LAB — HCV AB SERPL QL IA: NONREACTIVE

## 2025-03-13 ENCOUNTER — TELEPHONE (OUTPATIENT)
Dept: FAMILY MEDICINE | Facility: CLINIC | Age: 40
End: 2025-03-13
Payer: COMMERCIAL

## 2025-03-13 DIAGNOSIS — D69.6 THROMBOCYTOPENIA: Primary | ICD-10-CM

## 2025-03-13 DIAGNOSIS — R74.8 ELEVATED LIVER ENZYMES: ICD-10-CM

## 2025-03-13 LAB
PATH REPORT.COMMENTS IMP SPEC: NORMAL
PATH REPORT.FINAL DX SPEC: NORMAL
PATH REPORT.MICROSCOPIC SPEC OTHER STN: NORMAL
PATH REPORT.MICROSCOPIC SPEC OTHER STN: NORMAL
PATH REPORT.RELEVANT HX SPEC: NORMAL

## 2025-03-13 RX ORDER — PHYTONADIONE 5 MG/1
5 TABLET ORAL DAILY
Qty: 3 TABLET | Refills: 0 | Status: SHIPPED | OUTPATIENT
Start: 2025-03-13

## 2025-03-13 NOTE — PROGRESS NOTES
Dr Herman hematology has recommended Vitamin K 5 mg x 3 doses. Then recheck anticoag labs.   CT abd/pelvis venogram is ordered.     Chastity Cesar, APRN, CNP

## 2025-03-14 ENCOUNTER — HOSPITAL ENCOUNTER (OUTPATIENT)
Dept: CT IMAGING | Facility: CLINIC | Age: 40
Discharge: HOME | End: 2025-03-14
Attending: NURSE PRACTITIONER
Payer: COMMERCIAL

## 2025-03-14 DIAGNOSIS — F10.10 ALCOHOL ABUSE: ICD-10-CM

## 2025-03-14 DIAGNOSIS — R74.8 ELEVATED LIVER ENZYMES: ICD-10-CM

## 2025-03-14 DIAGNOSIS — D69.6 THROMBOCYTOPENIA: ICD-10-CM

## 2025-03-14 PROCEDURE — 74174 CTA ABD&PLVS W/CONTRAST: CPT

## 2025-03-14 PROCEDURE — 250N000009 HC RX 250: Performed by: NURSE PRACTITIONER

## 2025-03-14 PROCEDURE — 250N000011 HC RX IP 250 OP 636: Performed by: NURSE PRACTITIONER

## 2025-03-14 RX ORDER — IOPAMIDOL 755 MG/ML
104 INJECTION, SOLUTION INTRAVASCULAR ONCE
Status: COMPLETED | OUTPATIENT
Start: 2025-03-14 | End: 2025-03-14

## 2025-03-14 RX ORDER — FLUOROMETHOLONE 1 MG/ML
SUSPENSION/ DROPS OPHTHALMIC
COMMUNITY
Start: 2025-02-05

## 2025-03-14 RX ADMIN — SODIUM CHLORIDE 70 ML: 9 INJECTION, SOLUTION INTRAVENOUS at 11:23

## 2025-03-14 RX ADMIN — IOPAMIDOL 104 ML: 755 INJECTION, SOLUTION INTRAVENOUS at 11:23

## 2025-03-17 ENCOUNTER — LAB (OUTPATIENT)
Dept: LAB | Facility: CLINIC | Age: 40
End: 2025-03-17
Payer: COMMERCIAL

## 2025-03-17 ENCOUNTER — PATIENT OUTREACH (OUTPATIENT)
Dept: CARE COORDINATION | Facility: CLINIC | Age: 40
End: 2025-03-17

## 2025-03-17 DIAGNOSIS — R74.8 ELEVATED LIVER ENZYMES: ICD-10-CM

## 2025-03-17 DIAGNOSIS — D69.6 THROMBOCYTOPENIA: ICD-10-CM

## 2025-03-17 DIAGNOSIS — F10.10 ALCOHOL ABUSE: ICD-10-CM

## 2025-03-17 LAB
ALBUMIN SERPL BCG-MCNC: 3.3 G/DL (ref 3.5–5.2)
ALP SERPL-CCNC: 176 U/L (ref 40–150)
ALT SERPL W P-5'-P-CCNC: 58 U/L (ref 0–70)
AST SERPL W P-5'-P-CCNC: 178 U/L (ref 0–45)
BASOPHILS # BLD AUTO: 0.1 10E3/UL (ref 0–0.2)
BASOPHILS NFR BLD AUTO: 2 %
BILIRUB DIRECT SERPL-MCNC: 4.56 MG/DL (ref 0–0.3)
BILIRUB SERPL-MCNC: 8.2 MG/DL
EOSINOPHIL # BLD AUTO: 0.2 10E3/UL (ref 0–0.7)
EOSINOPHIL NFR BLD AUTO: 4 %
ERYTHROCYTE [DISTWIDTH] IN BLOOD BY AUTOMATED COUNT: 14.3 % (ref 10–15)
HCT VFR BLD AUTO: 44 % (ref 40–53)
HGB BLD-MCNC: 15.4 G/DL (ref 13.3–17.7)
IMM GRANULOCYTES # BLD: 0 10E3/UL
IMM GRANULOCYTES NFR BLD: 0 %
LYMPHOCYTES # BLD AUTO: 1.4 10E3/UL (ref 0.8–5.3)
LYMPHOCYTES NFR BLD AUTO: 24 %
MCH RBC QN AUTO: 36 PG (ref 26.5–33)
MCHC RBC AUTO-ENTMCNC: 35 G/DL (ref 31.5–36.5)
MCV RBC AUTO: 103 FL (ref 78–100)
MONOCYTES # BLD AUTO: 0.9 10E3/UL (ref 0–1.3)
MONOCYTES NFR BLD AUTO: 15 %
NEUTROPHILS # BLD AUTO: 3.1 10E3/UL (ref 1.6–8.3)
NEUTROPHILS NFR BLD AUTO: 55 %
NRBC # BLD AUTO: 0 10E3/UL
NRBC BLD AUTO-RTO: 0 /100
PLATELET # BLD AUTO: 30 10E3/UL (ref 150–450)
PROT SERPL-MCNC: 7 G/DL (ref 6.4–8.3)
RBC # BLD AUTO: 4.28 10E6/UL (ref 4.4–5.9)
WBC # BLD AUTO: 5.7 10E3/UL (ref 4–11)

## 2025-03-17 PROCEDURE — 82746 ASSAY OF FOLIC ACID SERUM: CPT

## 2025-03-17 PROCEDURE — 82607 VITAMIN B-12: CPT

## 2025-03-17 PROCEDURE — 80076 HEPATIC FUNCTION PANEL: CPT

## 2025-03-17 PROCEDURE — 36415 COLL VENOUS BLD VENIPUNCTURE: CPT

## 2025-03-17 PROCEDURE — 85025 COMPLETE CBC W/AUTO DIFF WBC: CPT

## 2025-03-17 PROCEDURE — 99000 SPECIMEN HANDLING OFFICE-LAB: CPT

## 2025-03-17 PROCEDURE — 84425 ASSAY OF VITAMIN B-1: CPT | Mod: 90

## 2025-03-18 LAB
FOLATE SERPL-MCNC: 4.5 NG/ML (ref 4.6–34.8)
VIT B12 SERPL-MCNC: 1787 PG/ML (ref 232–1245)

## 2025-03-19 LAB — HIV 1+2 AB+HIV1 P24 AG SERPL QL IA: NONREACTIVE

## 2025-03-23 ENCOUNTER — HEALTH MAINTENANCE LETTER (OUTPATIENT)
Age: 40
End: 2025-03-23

## 2025-03-24 ENCOUNTER — OFFICE VISIT (OUTPATIENT)
Dept: FAMILY MEDICINE | Facility: CLINIC | Age: 40
End: 2025-03-24
Payer: COMMERCIAL

## 2025-03-24 VITALS
HEART RATE: 90 BPM | SYSTOLIC BLOOD PRESSURE: 120 MMHG | BODY MASS INDEX: 27.57 KG/M2 | TEMPERATURE: 97 F | HEIGHT: 73 IN | RESPIRATION RATE: 16 BRPM | OXYGEN SATURATION: 95 % | DIASTOLIC BLOOD PRESSURE: 70 MMHG | WEIGHT: 208 LBS

## 2025-03-24 DIAGNOSIS — F10.10 ALCOHOL ABUSE: ICD-10-CM

## 2025-03-24 DIAGNOSIS — R74.8 ELEVATED LIVER ENZYMES: ICD-10-CM

## 2025-03-24 DIAGNOSIS — K72.10 CHRONIC LIVER FAILURE WITHOUT HEPATIC COMA (H): Primary | ICD-10-CM

## 2025-03-24 LAB
ALBUMIN SERPL BCG-MCNC: 3.1 G/DL (ref 3.5–5.2)
ALP SERPL-CCNC: 299 U/L (ref 40–150)
ALT SERPL W P-5'-P-CCNC: 69 U/L (ref 0–70)
AST SERPL W P-5'-P-CCNC: 232 U/L (ref 0–45)
BILIRUB DIRECT SERPL-MCNC: 2.39 MG/DL (ref 0–0.3)
BILIRUB SERPL-MCNC: 3.3 MG/DL
ERYTHROCYTE [DISTWIDTH] IN BLOOD BY AUTOMATED COUNT: 15.1 % (ref 10–15)
HCT VFR BLD AUTO: 43.9 % (ref 40–53)
HGB BLD-MCNC: 15.1 G/DL (ref 13.3–17.7)
MCH RBC QN AUTO: 36.3 PG (ref 26.5–33)
MCHC RBC AUTO-ENTMCNC: 34.4 G/DL (ref 31.5–36.5)
MCV RBC AUTO: 106 FL (ref 78–100)
PLATELET # BLD AUTO: 64 10E3/UL (ref 150–450)
PROT SERPL-MCNC: 7 G/DL (ref 6.4–8.3)
RBC # BLD AUTO: 4.16 10E6/UL (ref 4.4–5.9)
WBC # BLD AUTO: 6.9 10E3/UL (ref 4–11)

## 2025-03-24 PROCEDURE — 1126F AMNT PAIN NOTED NONE PRSNT: CPT | Performed by: NURSE PRACTITIONER

## 2025-03-24 PROCEDURE — 3078F DIAST BP <80 MM HG: CPT | Performed by: NURSE PRACTITIONER

## 2025-03-24 PROCEDURE — 85027 COMPLETE CBC AUTOMATED: CPT | Performed by: NURSE PRACTITIONER

## 2025-03-24 PROCEDURE — 36415 COLL VENOUS BLD VENIPUNCTURE: CPT | Performed by: NURSE PRACTITIONER

## 2025-03-24 PROCEDURE — 80076 HEPATIC FUNCTION PANEL: CPT | Performed by: NURSE PRACTITIONER

## 2025-03-24 PROCEDURE — 3074F SYST BP LT 130 MM HG: CPT | Performed by: NURSE PRACTITIONER

## 2025-03-24 PROCEDURE — 99213 OFFICE O/P EST LOW 20 MIN: CPT | Performed by: NURSE PRACTITIONER

## 2025-03-24 RX ORDER — MULTIVITAMIN
1 TABLET ORAL DAILY
COMMUNITY

## 2025-03-24 ASSESSMENT — PAIN SCALES - GENERAL: PAINLEVEL_OUTOF10: NO PAIN (0)

## 2025-03-24 NOTE — ASSESSMENT & PLAN NOTE
Has tapered his alcohol consumption down to a case of beer/week. We discussed that the goal is to taper it down to complete alcohol cessation.   He has chemical dependency referral has been given to him.  He states he has used chemical dependency in the past without success.  We also briefly discussed that if his liver improves we could use naltrexone to help with alcohol cravings.  At this point I suspect that he will likely not discontinue alcohol completely.  But we will continue to monitor.

## 2025-03-24 NOTE — ASSESSMENT & PLAN NOTE
ALT58    Fib 4 score 14.3  Fibroscan scheduled for tomorrow.     Recheck liver enzymes today.  Referral to Hepatologist once I have the results of the Fibroscan.   Discussed alcohol cessation  Symptoms of neuropathy and swelling in his legs have resolved.  CT scan showed no blood clots in the vein vessel system of his abdomen pelvis.  But did show a cirrhotic changes of his liver, splenomegaly and ascites.  Will continue to monitor platelets as well, if dropping will include Dr Herman's input and hospitalization.

## 2025-03-24 NOTE — PROGRESS NOTES
Assessment & Plan   Problem List Items Addressed This Visit       Alcohol abuse     Has tapered his alcohol consumption down to a case of beer/week. We discussed that the goal is to taper it down to complete alcohol cessation.   He has chemical dependency referral has been given to him.  He states he has used chemical dependency in the past without success.  We also briefly discussed that if his liver improves we could use naltrexone to help with alcohol cravings.  At this point I suspect that he will likely not discontinue alcohol completely.  But we will continue to monitor.           Chronic liver failure without hepatic coma (H) - Primary       ALT58    Fib 4 score 14.3  Fibroscan scheduled for tomorrow.     Recheck liver enzymes today.  Referral to Hepatologist once I have the results of the Fibroscan.   Discussed alcohol cessation  Symptoms of neuropathy and swelling in his legs have resolved.  CT scan showed no blood clots in the vein vessel system of his abdomen pelvis.  But did show a cirrhotic changes of his liver, splenomegaly and ascites.  Will continue to monitor platelets as well, if dropping will include Dr Herman's input and hospitalization.          Other Visit Diagnoses       Elevated liver enzymes        Relevant Orders    Hepatic panel (Albumin, ALT, AST, Bili, Alk Phos, TP)    CBC with platelets                  FUTURE APPOINTMENTS:       - Follow-up for visit in 2-3 weeks      Yudi Ray is a 39 year old, presenting for the following health issues:  No chief complaint on file.    History of Present Illness      He is taking medications regularly.        Alcohol states he has cut it down; down to a case of beer per week. Discussed he needs to taper it down to zero  Liver feels better, no swelling in his belly  Platelets, went over testing, imaging   Right lower leg, swelling is gone,   Feels the numbness and tingling in his legs are improving. Would like to hold on the referrals  "to neurology for now.     No bleeding, still eating healthy foods.     CTV abd/pelvis 3/14/2025  IMPRESSION:   1.  No intra-abdominal venous thrombosis.  2.  Cirrhotic morphology of the liver. Evidence of portal hypertension with mild splenomegaly, recanalized umbilical vein, paraesophageal and portosystemic varices, and trace ascites.  3.  Small amount of fluid around the gallbladder is nonspecific, likely related to ascites and cirrhosis. Right upper quadrant ultrasound can be considered for further evaluation as clinically indicated.  4.  Circumferential wall thickening in the cecum, ascending colon and transverse colon is favored to be due to portal hypertensive colopathy. Please correlate clinically for colitis, which may be infectious or inflammatory.    Platelets were still really low likely liver failure in nature, input given from Dr Herman, hematologist.     Review of Systems  Constitutional, HEENT, cardiovascular, pulmonary, gi and gu systems are negative, except as otherwise noted.      Objective    /70 (BP Location: Right arm, Patient Position: Standing, Cuff Size: Adult Large)   Pulse 90   Temp 97  F (36.1  C) (Tympanic)   Resp 16   Ht 1.861 m (6' 1.25\")   Wt 94.3 kg (208 lb)   SpO2 95%   BMI 27.26 kg/m    Body mass index is 27.26 kg/m .  Physical Exam  Constitutional:       Appearance: Normal appearance.   HENT:      Head: Normocephalic.   Pulmonary:      Effort: Pulmonary effort is normal. No respiratory distress.   Musculoskeletal:      Right lower leg: No edema.      Left lower leg: No edema.   Skin:     General: Skin is warm and dry.   Neurological:      Mental Status: He is alert and oriented to person, place, and time.   Psychiatric:         Mood and Affect: Mood normal.         Behavior: Behavior normal.         Thought Content: Thought content normal.         Judgment: Judgment normal.                    Signed Electronically by: RICHARD Amaya CNP    "

## 2025-03-25 ENCOUNTER — TRANSFERRED RECORDS (OUTPATIENT)
Dept: GASTROENTEROLOGY | Facility: CLINIC | Age: 40
End: 2025-03-25

## 2025-03-28 ENCOUNTER — MYC MEDICAL ADVICE (OUTPATIENT)
Dept: FAMILY MEDICINE | Facility: CLINIC | Age: 40
End: 2025-03-28
Payer: COMMERCIAL

## 2025-04-14 ENCOUNTER — TELEPHONE (OUTPATIENT)
Dept: FAMILY MEDICINE | Facility: CLINIC | Age: 40
End: 2025-04-14
Payer: COMMERCIAL

## 2025-04-14 NOTE — TELEPHONE ENCOUNTER
Pt and girlfriend call and report pt stood up this morning and has something coming out of rectum. No hx of hemorrhoids in past. Denies bleeding. Reports is not painful or itchy. States is the size of a quarter. Denies constipation. Denies abd pain or fever. Not on blood thinners. No other symptoms. Pt and girlfriend are not familiar with hemorrhoids so they are unsure if this is what is coming out. Girlfriend states it just looks like skin.   Pt agrees to go to  today to have this assessed.       John Lambert RN

## 2025-06-17 ENCOUNTER — OFFICE VISIT (OUTPATIENT)
Dept: GASTROENTEROLOGY | Facility: CLINIC | Age: 40
End: 2025-06-17
Attending: INTERNAL MEDICINE
Payer: COMMERCIAL

## 2025-06-17 VITALS
BODY MASS INDEX: 27.57 KG/M2 | DIASTOLIC BLOOD PRESSURE: 67 MMHG | HEART RATE: 116 BPM | HEIGHT: 73 IN | WEIGHT: 208 LBS | OXYGEN SATURATION: 97 % | TEMPERATURE: 97.9 F | SYSTOLIC BLOOD PRESSURE: 98 MMHG

## 2025-06-17 DIAGNOSIS — K70.10 ALCOHOLIC HEPATITIS WITHOUT ASCITES (H): ICD-10-CM

## 2025-06-17 DIAGNOSIS — R74.8 ELEVATED LIVER ENZYMES: ICD-10-CM

## 2025-06-17 DIAGNOSIS — F10.90 ALCOHOL USE DISORDER: ICD-10-CM

## 2025-06-17 DIAGNOSIS — K70.30 ALCOHOLIC CIRRHOSIS OF LIVER WITHOUT ASCITES (H): ICD-10-CM

## 2025-06-17 PROBLEM — K72.10 CHRONIC LIVER FAILURE WITHOUT HEPATIC COMA (H): Status: RESOLVED | Noted: 2025-03-24 | Resolved: 2025-06-17

## 2025-06-17 PROCEDURE — G0463 HOSPITAL OUTPT CLINIC VISIT: HCPCS | Performed by: INTERNAL MEDICINE

## 2025-06-17 ASSESSMENT — PAIN SCALES - GENERAL: PAINLEVEL_OUTOF10: NO PAIN (0)

## 2025-06-17 NOTE — Clinical Note
6/17/2025      Cory Magallanes  [address]  [facility] [address]      Dear Colleague,    Thank you for referring your patient, Cory Magallanes, to the [facility] [place]. Please see a copy of my visit note below.    No notes on file    Again, thank you for allowing me to participate in the care of your patient.        Sincerely,        DARRON CA MD    Electronically signed

## 2025-06-17 NOTE — NURSING NOTE
"Chief Complaint   Patient presents with    New Patient     Complete RM. Chronic liver failure without hepatic coma (H) R74.8 (ICD-10-CM) Elevated liver enzymes  F10.10 (ICD-10-CM) - Alcohol abuse        BP 98/67 (BP Location: Left arm, Patient Position: Sitting)   Pulse 116   Temp 97.9  F (36.6  C) (Oral)   Ht 1.861 m (6' 1.25\")   Wt 94.3 kg (208 lb)   SpO2 97%   BMI 27.26 kg/m    Rusty Chambers CMA on 6/17/2025 at 9:44 AM    "
no

## 2025-06-17 NOTE — PATIENT INSTRUCTIONS
It was a pleasure taking care of you today.  I've included a brief summary of our discussion and care plan from today's visit below.  Please review this information with your primary care provider.  _______________________________________________________________________    My recommendations are summarized as follows:    Resources for mental health/substance abuse/counseling    Care Counseling  [email or web address]  [email or web address]    We discussed cirrhosis and the natural history and management.  You must STOP all alcohol and engage inc counseling. Please see above and will also put referral in for symptom management and resources/programs.  We discussed requirements for transplant including alcohol requirements.  Your liver function is MILD-MODERATELY impaired from alcohol.  You will need liver cancer screening every 6 months- due September.  You need to get an upper scope EGD to look for dilated veins int the esophagus (Varices)        Return to Liver/Hepatology Clinic in 6 months.    _______________________________________________________________________     _______________________________________________________________________    Scheduling Procedures or Tests  - To schedule endoscopic procedures call: [phone]  - To schedule radiology tests call: [phone] (for [facility]) or [phone] (for [facility])   _______________________________________________________________________    Who do I call with any questions after my visit?  Please be in touch if there are any further questions that arise following today's visit.  There are multiple ways to contact your gastroenterology care team.      To schedule or reschedule an appointment, please call [phone] and choose option 2 (for hepatology) and then option 1 (for scheduling).    During business hours, you may reach a nurse by calling the appointment line  ([phone]) and asking to speak with a nurse    You can send a secure message through [employer] for non-urgent questions. [employer] messages are answered by your nurse or doctor typically within 24 to 48 hours. Please allow extra time on weekends and holidays.      For urgent/emergent questions after business hours, you may reach the on-call GI Fellow by contacting the [facility] at [phone].     How will I get the results of any tests ordered?    - If you have signed up for MyChart, any tests ordered at your visit will be available to you after your physician reviews them.  Typically this takes 1-2 weeks.    - If you do not have MyChart, your results will either be mailed to you as a letter or via a telephone call.  - If there are urgent results that require a change in your care plan, your physician or nurse will call you to discuss the next steps.     What is [employer]?  [employer] is a secure way for you to access all of your healthcare records from the [facility].  It is a web based computer program, so you can sign on to it from any location.  It also allows you to send secure messages to your care team.  I recommend signing up for [employer] access if you have not already done so and are comfortable with using a computer.      How to I schedule a follow-up visit?  If you did not schedule a follow-up visit today, please call [phone] to schedule a follow-up office visit.     Sincerely,    Eloina Vazquez MD  Advanced & Transplant Hepatology  [facility]

## 2025-06-23 ENCOUNTER — TELEPHONE (OUTPATIENT)
Dept: GASTROENTEROLOGY | Facility: CLINIC | Age: 40
End: 2025-06-23

## 2025-06-23 ENCOUNTER — LAB (OUTPATIENT)
Dept: LAB | Facility: CLINIC | Age: 40
End: 2025-06-23
Payer: COMMERCIAL

## 2025-06-23 ENCOUNTER — PATIENT OUTREACH (OUTPATIENT)
Dept: CARE COORDINATION | Facility: CLINIC | Age: 40
End: 2025-06-23

## 2025-06-23 DIAGNOSIS — K70.30 ALCOHOLIC CIRRHOSIS OF LIVER WITHOUT ASCITES (H): ICD-10-CM

## 2025-06-23 LAB
ALBUMIN SERPL BCG-MCNC: 3.3 G/DL (ref 3.5–5.2)
ALP SERPL-CCNC: 178 U/L (ref 40–150)
ALT SERPL W P-5'-P-CCNC: 35 U/L (ref 0–70)
ANION GAP SERPL CALCULATED.3IONS-SCNC: 11 MMOL/L (ref 7–15)
AST SERPL W P-5'-P-CCNC: 79 U/L (ref 0–45)
BILIRUB DIRECT SERPL-MCNC: 2.5 MG/DL (ref 0–0.3)
BILIRUB SERPL-MCNC: 5.6 MG/DL
BUN SERPL-MCNC: 9.5 MG/DL (ref 6–20)
CALCIUM SERPL-MCNC: 9 MG/DL (ref 8.8–10.4)
CHLORIDE SERPL-SCNC: 103 MMOL/L (ref 98–107)
CREAT SERPL-MCNC: 0.83 MG/DL (ref 0.67–1.17)
EGFRCR SERPLBLD CKD-EPI 2021: >90 ML/MIN/1.73M2
ERYTHROCYTE [DISTWIDTH] IN BLOOD BY AUTOMATED COUNT: 14.2 % (ref 10–15)
GLUCOSE SERPL-MCNC: 117 MG/DL (ref 70–99)
HCO3 SERPL-SCNC: 23 MMOL/L (ref 22–29)
HCT VFR BLD AUTO: 43.8 % (ref 40–53)
HGB BLD-MCNC: 15.1 G/DL (ref 13.3–17.7)
INR PPP: 1.54 (ref 0.85–1.15)
MCH RBC QN AUTO: 33.8 PG (ref 26.5–33)
MCHC RBC AUTO-ENTMCNC: 34.5 G/DL (ref 31.5–36.5)
MCV RBC AUTO: 98 FL (ref 78–100)
PLATELET # BLD AUTO: 51 10E3/UL (ref 150–450)
POTASSIUM SERPL-SCNC: 3.7 MMOL/L (ref 3.4–5.3)
PROT SERPL-MCNC: 7.3 G/DL (ref 6.4–8.3)
PROTHROMBIN TIME: 18.1 SECONDS (ref 11.8–14.8)
RBC # BLD AUTO: 4.47 10E6/UL (ref 4.4–5.9)
SODIUM SERPL-SCNC: 137 MMOL/L (ref 135–145)
WBC # BLD AUTO: 5 10E3/UL (ref 4–11)

## 2025-06-23 PROCEDURE — 82248 BILIRUBIN DIRECT: CPT

## 2025-06-23 PROCEDURE — 82105 ALPHA-FETOPROTEIN SERUM: CPT

## 2025-06-23 PROCEDURE — 86706 HEP B SURFACE ANTIBODY: CPT

## 2025-06-23 PROCEDURE — 36415 COLL VENOUS BLD VENIPUNCTURE: CPT

## 2025-06-23 PROCEDURE — G0480 DRUG TEST DEF 1-7 CLASSES: HCPCS | Mod: 90

## 2025-06-23 PROCEDURE — 85610 PROTHROMBIN TIME: CPT

## 2025-06-23 PROCEDURE — 86704 HEP B CORE ANTIBODY TOTAL: CPT

## 2025-06-23 PROCEDURE — 85027 COMPLETE CBC AUTOMATED: CPT

## 2025-06-23 PROCEDURE — 80053 COMPREHEN METABOLIC PANEL: CPT

## 2025-06-23 NOTE — TELEPHONE ENCOUNTER
DATE/TIME OF CALL RECEIVED FROM LAB:  06/23/25 at 3:12 PM   LAB TEST:  Platelets   LAB VALUE:  48  PROVIDER NOTIFIED?: Yes  PROVIDER NAME: Dr. Vazquez, Laxmi Rios RN, Aixa Estrada LPN   DATE/TIME LAB VALUE REPORTED TO PROVIDER: 6/23/25 @3:12 pm  MECHANISM OF PROVIDER NOTIFICATION: In Basket message   PROVIDER RESPONSE: TBD

## 2025-06-24 LAB
AFP SERPL-MCNC: 5.9 NG/ML
HBV CORE AB SERPL QL IA: NONREACTIVE
HBV SURFACE AB SERPL IA-ACNC: <3.5 M[IU]/ML
HBV SURFACE AB SERPL IA-ACNC: NONREACTIVE M[IU]/ML

## 2025-06-25 LAB
LABORATORY REPORT: NORMAL
PETH INTERPRETATION: NORMAL
PLPETH BLD-MCNC: 875 NG/ML
POPETH BLD-MCNC: 1122 NG/ML

## 2025-07-17 NOTE — PROGRESS NOTES
"[facility] Hepatology    New Patient Visit    Referring provider:  Chastity Baltazar    Chief complaint:  New cirrhosis      An  was used for this encounter NO    38yo M with pmhx alcohol use disorder; suspected acute pancreatitis 2/2 EtOH 2016; hx concussion; neuropathy; and sciatica, referred for above.      HPI:  EtOH Cirrhosis  - dx ~ 3/2025 presented to ER with \"stomach pain\" and \"foot swelling\". Noted to have elevated LT (AST>ALT, alk phos high, bili 8), thrombocytopenia (30). Then CT a/p revealed cirrhotic appearing liver with portal HTN.  Confirmed by Fibroscan: kPa 61, IQR 6%,   - no hx HE  - no hx ascites  - no hx variceal bleed  - last EGD none  - HCC screening no masses on CT    Denies current abdominal distension, lower extremity edema, lethargy or confusion. Denies melena, hematemesis or hematochezia. Denies fevers, sweats, chills or weight loss.    Medical hx Surgical hx   Past Medical History:   Diagnosis Date    Alcohol abuse       No past surgical history on file.       Medications  Current Outpatient Medications   Medication Sig Dispense Refill    gabapentin (NEURONTIN) 100 MG capsule Take 1 capsule (100 mg) by mouth 3 times daily. 90 capsule 0    multivitamin w/minerals (MULTI-VITAMIN) tablet Take 1 tablet by mouth daily.      fluorometholone (FML LIQUIFILM) 0.1 % ophthalmic suspension INSTILL 1 DROP INTO EACH EYE THREE TIMES DAILY (Patient not taking: Reported on 3/24/2025)         Allergies  No Known Allergies    Family hx Social hx   Family History   Problem Relation Age of Onset    Substance Abuse Maternal Grandfather         alcoholism     Coronary Artery Disease Paternal Grandfather         MI age? 60     No FH liver disease  No FH CRC Social History     Tobacco Use    Smoking status: Every Day     Current packs/day: 1.00     Average packs/day: 1 pack/day for 15.0 years (15.0 ttl pk-yrs)     Types: Cigarettes    Smokeless tobacco: Never    Tobacco comments:     " "1/2 ppd.   Vaping Use    Vaping status: Never Used   Substance Use Topics    Alcohol use: Yes     Comment: once a week     Drug use: No     Has girlfriend  3 children aged 22/ 21/ 19 yo  Working as   No CBD/MJ/THC  EtOH- \"cut down\", previously drank 8 + beers + hard liquior each weekday, then case beer/weekend  DWI x 1  No other legal troubles r/t EtOH  2 x treatment programs for substance abuse including through [facility] 10day inpatient stay; and other combined inpt/outpt 3 mos program  No RF viral hepatitis  HCV screen negative in March  No Hep A/B on file     Review of systems  A 10-point review of systems was negative.    Examination  BP 98/67 (BP Location: Left arm, Patient Position: Sitting)   Pulse 116   Temp 97.9  F (36.6  C) (Oral)   Ht 1.861 m (6' 1.25\")   Wt 94.3 kg (208 lb)   SpO2 97%   BMI 27.26 kg/m     Body mass index is 27.26 kg/m .    Gen-NAD  Eye- +icteric  ENT- MMM, thyroid wnl, No LAD  CVS- RRR, no murmurs  RS- CTA bilaterally, no clubbing  Abd- S/NT/ND, no hernias  Extr- 2+ radial pulses bilaterally, No lower extremity edema bilaterally  MSK- no synovitis, no deformities  Neuro- A+Ox3, no asterixis  Skin- + mild jaundice; + spiders/telangiectasias, + palmar erythema  Psych- normal mood    Laboratory  Reviewed 3/24/25  Bili 3.3    Radiology  CTV a/p with contrast 3/14/25  IMPRESSION:   1.  No intra-abdominal venous thrombosis.  2.  Cirrhotic morphology of the liver. Evidence of portal hypertension with mild splenomegaly, recanalized umbilical vein, paraesophageal and portosystemic varices, and trace ascites.  3.  Small amount of fluid around the gallbladder is nonspecific, likely related to ascites and cirrhosis. Right upper quadrant ultrasound can be considered for further evaluation as clinically indicated.  4.  Circumferential wall thickening in the cecum, ascending colon and transverse colon is favored to be due to portal hypertensive colopathy. Please correlate clinically for " colitis, which may be infectious or inflammatory.    Endoscopy  none    Assessment  EtOH Cirrhosis: with acute alc hep 3/2025.  Ow compensated with CSPH.  Confirmed by Fibroscan (non invasive assessment).  MELD 3.0 = 15    OLT candidacy: does not meet psychosocial criteria for OLT, should he require it.  Potential barriers to transplant include ongoing EtOH use despite knowledge of liver disease (and hx EtOH pancreatitis back 2016).   High risk for relapse. 2 x programs in the past with return to use.      Plan  -- We discussed the etiologies, diagnosis, natural history, and management of cirrhosis.  -- We discussed both acute and chronic injury from alcohol.  -- We discussed the associated mortality and complications of acute alc hep.  -- We discussed the need for absolute EtOH cessation and assessment and enrollment in programming. Referral placed for MH/Chem Dep.  -- We discussed briefly that he is not an OLT candidate should he become more ill and it is needed.  -- Diuretics: none. Trace ascites by imaging. Could trial low dose.  -- Hepatic encephalopathy management: none.  -- HCC Screening: abdominal US + AFP q6 months, next due September.   -- Variceal Screening next due now with ANES.    Health Maintenance:  -- Recommend yearly influenza vaccination   -- Recommend dental visits every 6 months  -- Colon Cancer Screening: average risk, age 45. With recent diffuse BWT by CT in March, possible con comitant virus, portal HTN.  -- Breast Cancer Screening: mammogram per PCP   -- Cervical Cancer Screening: pap smear + HPV co-testing  per PCP     Nutrition & Physical Activity:  -- Recommend low sodium (< 2 grams per day) + high protein diet given malnutrition.    RTC 6 mos         I spent 60 minutes on this encounter performing the following: reviewing the patient's medical record (clinic visits, hospital records, lab results, imaging and procedural documentation), history taking, physical exam and documentation on  the date of the encounter. I also spent part of the time in coordination of care and counseling.          Eloina Vazquez MD  Advanced & Transplant Hepatology  [facility]

## 2025-07-21 ENCOUNTER — PATIENT OUTREACH (OUTPATIENT)
Dept: CARE COORDINATION | Facility: CLINIC | Age: 40
End: 2025-07-21
Payer: COMMERCIAL

## 2025-07-22 ENCOUNTER — TELEPHONE (OUTPATIENT)
Dept: GASTROENTEROLOGY | Facility: CLINIC | Age: 40
End: 2025-07-22
Payer: COMMERCIAL

## 2025-07-22 NOTE — TELEPHONE ENCOUNTER
"Endoscopy Scheduling Screen    Caller: SO    Have you had any respiratory illness or flu-like symptoms in the last 10 days?  No    Patient is ACTIVE on [employer].  Inform patient that all appointment instructions will be sent via [employer].    Review patient's insurance for any non participating payor.    Ordering/Referring Provider: Taylor   (If ordering provider performs procedure, schedule with ordering provider unless otherwise instructed. )    BMI: Estimated body mass index is 27.26 kg/m  as calculated from the following:    Height as of 6/17/25: 1.861 m (6' 1.25\").    Weight as of 6/17/25: 94.3 kg (208 lb).     Sedation Ordered  MAC/deep sedation.   BMI<= 45 45 < BMI <= 48 48 < BMI < = 50  BMI > 50   No Restrictions No [facility]  No [facility]  [facility] with exceptions Hospital Only OR Only       Do you have a history of malignant hyperthermia?  No    (Females) Are you currently pregnant?        Have you been diagnosed or told you have pulmonary hypertension?   No    Do you have an LVAD?  No    Have you been told you have moderate to severe sleep apnea?  No.    Have you been told you have COPD, asthma, or any other lung disease?  No    Has your doctor ordered any cardiac tests like echo, angiogram, stress test, ablation, or EKG, that you have not completed yet?  No    Do you  have a history of any heart conditions?  No     Have you ever had or are you waiting for an organ transplant?  No. Continue scheduling, no site restrictions.    Have you had a stroke or transient ischemic attack (TIA aka \"mini stroke\") in the last 2 years?   No.    Have you been diagnosed with or been told you have cirrhosis of the liver?   Yes. (RN Review required for scheduling unless scheduling in Hospital.)    Are you currently on dialysis?   No    Do you need assistance transferring?   No    BMI: Estimated body mass index is 27.26 kg/m  as calculated from the following:    Height as of 6/17/25: 1.861 m (6' 1.25\").    Weight as of 6/17/25: " 94.3 kg (208 lb).     Is patients BMI > 40 and scheduling location [facility]?  No    Do you take an injectable or oral medication for weight loss or diabetes (excluding insulin)?  No    Do you take the medication Naltrexone?  No    Do you take blood thinners?  No       Prep   Are you currently on dialysis or do you have chronic kidney disease?  No    Do you have a diagnosis of diabetes?  No    Do you have a diagnosis of cystic fibrosis (CF)?  No    On a regular basis do you go 3 -5 days between bowel movements?      BMI > 40?  No    Preferred Pharmacy:    [facility] - [place], [place] - [address]   [address] [facility] [address]  Phone: [phone] Fax: [phone]      Final Scheduling Details     Procedure scheduled  Upper endoscopy (EGD)    Surgeon:  Nehemiah     Date of procedure:  2/5/26     Pre-OP / PAC:   No - Not required for this site.    Location  [facility] - Per RN assessment. Per RN triage on order    Sedation   MAC/Deep Sedation - Per order.      Patient Reminders:   You will receive a call from a Nurse to review instructions and health history.  This assessment must be completed prior to your procedure.  Failure to complete the Nurse assessment may result in the procedure being cancelled.      On the day of your procedure, please designate an adult(s) who can drive you home stay with you for the next 24 hours. The medicines used in the exam will make you sleepy. You will not be able to drive.      You cannot take public transportation, ride share services, or non-medical taxi service without a responsible caregiver.  Medical transport services are allowed with the requirement that a responsible caregiver will receive you at your destination.  We require that drivers and caregivers are confirmed prior to your procedure.

## 2025-08-17 ENCOUNTER — HOSPITAL ENCOUNTER (EMERGENCY)
Facility: CLINIC | Age: 40
Discharge: HOME | End: 2025-08-17
Attending: FAMILY MEDICINE | Admitting: FAMILY MEDICINE
Payer: COMMERCIAL

## 2025-08-17 VITALS
RESPIRATION RATE: 18 BRPM | SYSTOLIC BLOOD PRESSURE: 119 MMHG | BODY MASS INDEX: 26.95 KG/M2 | OXYGEN SATURATION: 97 % | DIASTOLIC BLOOD PRESSURE: 85 MMHG | TEMPERATURE: 98.2 F | HEART RATE: 91 BPM | WEIGHT: 210 LBS | HEIGHT: 74 IN

## 2025-08-17 DIAGNOSIS — G62.9 NEUROPATHY: Primary | ICD-10-CM

## 2025-08-17 DIAGNOSIS — F10.129 ALCOHOL ABUSE WITH INTOXICATION: ICD-10-CM

## 2025-08-17 LAB
ALBUMIN SERPL BCG-MCNC: 3.5 G/DL (ref 3.5–5.2)
ALP SERPL-CCNC: 206 U/L (ref 40–150)
ALT SERPL W P-5'-P-CCNC: 41 U/L (ref 0–70)
ANION GAP SERPL CALCULATED.3IONS-SCNC: 12 MMOL/L (ref 7–15)
AST SERPL W P-5'-P-CCNC: 74 U/L (ref 0–45)
BASOPHILS # BLD AUTO: 0.09 10E3/UL (ref 0–0.2)
BASOPHILS NFR BLD AUTO: 1.6 %
BILIRUB SERPL-MCNC: 3.4 MG/DL
BUN SERPL-MCNC: 5.8 MG/DL (ref 6–20)
CALCIUM SERPL-MCNC: 8.5 MG/DL (ref 8.8–10.4)
CHLORIDE SERPL-SCNC: 106 MMOL/L (ref 98–107)
CREAT SERPL-MCNC: 0.89 MG/DL (ref 0.67–1.17)
EGFRCR SERPLBLD CKD-EPI 2021: >90 ML/MIN/1.73M2
EOSINOPHIL # BLD AUTO: 0.36 10E3/UL (ref 0–0.7)
EOSINOPHIL NFR BLD AUTO: 6.2 %
ERYTHROCYTE [DISTWIDTH] IN BLOOD BY AUTOMATED COUNT: 13.8 % (ref 10–15)
ETHANOL SERPL-MCNC: 0.24 G/DL
GLUCOSE SERPL-MCNC: 123 MG/DL (ref 70–99)
HCO3 SERPL-SCNC: 24 MMOL/L (ref 22–29)
HCT VFR BLD AUTO: 40.1 % (ref 40–53)
HGB BLD-MCNC: 14.2 G/DL (ref 13.3–17.7)
IMM GRANULOCYTES # BLD: <0.03 10E3/UL
IMM GRANULOCYTES NFR BLD: 0.3 %
INR PPP: 1.53 (ref 0.85–1.15)
LYMPHOCYTES # BLD AUTO: 1.75 10E3/UL (ref 0.8–5.3)
LYMPHOCYTES NFR BLD AUTO: 30.2 %
MCH RBC QN AUTO: 33.2 PG (ref 26.5–33)
MCHC RBC AUTO-ENTMCNC: 35.4 G/DL (ref 31.5–36.5)
MCV RBC AUTO: 93.7 FL (ref 78–100)
MONOCYTES # BLD AUTO: 0.62 10E3/UL (ref 0–1.3)
MONOCYTES NFR BLD AUTO: 10.7 %
NEUTROPHILS # BLD AUTO: 2.95 10E3/UL (ref 1.6–8.3)
NEUTROPHILS NFR BLD AUTO: 51 %
NRBC # BLD AUTO: <0.03 10E3/UL
NRBC BLD AUTO-RTO: 0 /100
PLATELET # BLD AUTO: 52 10E3/UL (ref 150–450)
POTASSIUM SERPL-SCNC: 3.8 MMOL/L (ref 3.4–5.3)
PROT SERPL-MCNC: 6.9 G/DL (ref 6.4–8.3)
PROTHROMBIN TIME: 18.1 SECONDS (ref 11.8–14.8)
RBC # BLD AUTO: 4.28 10E6/UL (ref 4.4–5.9)
SODIUM SERPL-SCNC: 142 MMOL/L (ref 135–145)
WBC # BLD AUTO: 5.79 10E3/UL (ref 4–11)

## 2025-08-17 PROCEDURE — 250N000013 HC RX MED GY IP 250 OP 250 PS 637: Performed by: FAMILY MEDICINE

## 2025-08-17 PROCEDURE — 99284 EMERGENCY DEPT VISIT MOD MDM: CPT | Performed by: FAMILY MEDICINE

## 2025-08-17 PROCEDURE — 36415 COLL VENOUS BLD VENIPUNCTURE: CPT | Performed by: FAMILY MEDICINE

## 2025-08-17 PROCEDURE — 80053 COMPREHEN METABOLIC PANEL: CPT | Performed by: FAMILY MEDICINE

## 2025-08-17 PROCEDURE — 82077 ASSAY SPEC XCP UR&BREATH IA: CPT | Performed by: FAMILY MEDICINE

## 2025-08-17 PROCEDURE — 85610 PROTHROMBIN TIME: CPT | Performed by: FAMILY MEDICINE

## 2025-08-17 PROCEDURE — 85004 AUTOMATED DIFF WBC COUNT: CPT | Performed by: FAMILY MEDICINE

## 2025-08-17 PROCEDURE — 99283 EMERGENCY DEPT VISIT LOW MDM: CPT | Performed by: FAMILY MEDICINE

## 2025-08-17 RX ORDER — OLANZAPINE 5 MG/1
5 TABLET, ORALLY DISINTEGRATING ORAL ONCE
Status: COMPLETED | OUTPATIENT
Start: 2025-08-17 | End: 2025-08-17

## 2025-08-17 RX ORDER — GABAPENTIN 300 MG/1
300 CAPSULE ORAL AT BEDTIME
Qty: 30 CAPSULE | Refills: 0 | Status: SHIPPED | OUTPATIENT
Start: 2025-08-17

## 2025-08-17 RX ORDER — GABAPENTIN 300 MG/1
300 CAPSULE ORAL ONCE
Status: COMPLETED | OUTPATIENT
Start: 2025-08-17 | End: 2025-08-17

## 2025-08-17 RX ADMIN — OLANZAPINE 5 MG: 5 TABLET, ORALLY DISINTEGRATING ORAL at 22:31

## 2025-08-17 RX ADMIN — GABAPENTIN 300 MG: 300 CAPSULE ORAL at 23:29

## 2025-08-17 ASSESSMENT — COLUMBIA-SUICIDE SEVERITY RATING SCALE - C-SSRS
1. IN THE PAST MONTH, HAVE YOU WISHED YOU WERE DEAD OR WISHED YOU COULD GO TO SLEEP AND NOT WAKE UP?: NO
6. HAVE YOU EVER DONE ANYTHING, STARTED TO DO ANYTHING, OR PREPARED TO DO ANYTHING TO END YOUR LIFE?: NO
2. HAVE YOU ACTUALLY HAD ANY THOUGHTS OF KILLING YOURSELF IN THE PAST MONTH?: NO

## 2025-08-17 ASSESSMENT — ACTIVITIES OF DAILY LIVING (ADL)
ADLS_ACUITY_SCORE: 41
ADLS_ACUITY_SCORE: 41

## 2025-08-18 ENCOUNTER — PATIENT OUTREACH (OUTPATIENT)
Dept: CARE COORDINATION | Facility: CLINIC | Age: 40
End: 2025-08-18
Payer: COMMERCIAL

## 2025-08-20 ENCOUNTER — PATIENT OUTREACH (OUTPATIENT)
Dept: CARE COORDINATION | Facility: CLINIC | Age: 40
End: 2025-08-20
Payer: COMMERCIAL

## 2025-09-03 ENCOUNTER — HOSPITAL ENCOUNTER (OUTPATIENT)
Dept: ULTRASOUND IMAGING | Facility: CLINIC | Age: 40
Discharge: HOME | End: 2025-09-03
Attending: INTERNAL MEDICINE
Payer: COMMERCIAL

## 2025-09-03 DIAGNOSIS — K70.30 ALCOHOLIC CIRRHOSIS OF LIVER WITHOUT ASCITES (H): ICD-10-CM

## 2025-09-03 PROCEDURE — 76705 ECHO EXAM OF ABDOMEN: CPT
